# Patient Record
Sex: FEMALE | NOT HISPANIC OR LATINO | URBAN - METROPOLITAN AREA
[De-identification: names, ages, dates, MRNs, and addresses within clinical notes are randomized per-mention and may not be internally consistent; named-entity substitution may affect disease eponyms.]

---

## 2018-03-08 ENCOUNTER — AMBULATORY - HEALTHEAST (OUTPATIENT)
Dept: ADMINISTRATIVE | Facility: REHABILITATION | Age: 68
End: 2018-03-08

## 2018-03-08 DIAGNOSIS — R42 VERTIGO: ICD-10-CM

## 2018-03-09 ENCOUNTER — OFFICE VISIT - HEALTHEAST (OUTPATIENT)
Dept: OCCUPATIONAL THERAPY | Facility: REHABILITATION | Age: 68
End: 2018-03-09

## 2018-03-09 DIAGNOSIS — Z78.9 DECREASED ACTIVITIES OF DAILY LIVING (ADL): ICD-10-CM

## 2018-03-09 DIAGNOSIS — H81.11 BPPV (BENIGN PAROXYSMAL POSITIONAL VERTIGO), RIGHT: ICD-10-CM

## 2018-03-09 DIAGNOSIS — R26.81 UNSTEADINESS ON FEET: ICD-10-CM

## 2021-06-16 NOTE — PROGRESS NOTES
[facility] Certification Request    March 9, 2018      Patient: Evelia Mills  MR Number: [id number]  YOB: 1950  Date of Visit: 3/9/2018      Dear Dr. Dhaval Wise:    Thank you for this referral.   We are seeing Evelia Mills in Occupational Therapy for vertigo.    Medicare and/or Medicaid requires physician review and approval of the treatment plan. Please review the plan of care and verify that you agree with the therapy plan of care by co-signing this note.    Plan of Care  Authorization / Certification Start Date: 03/09/18  Authorization / Certification End Date: 06/07/18  Authorization / Certification Number of Visits: 12  Communication with: Referral Source  Patient Related Instruction: Nature of Condition;Treatment plan and rationale;Basis of treatment;Expected outcome  Times per Week: 1  Number of Weeks: 12  Number of Visits: 12  Neuromuscular Reeducation: vestibular  Canolith Repostioning:      Goals:  Patient will bend: to dress;to clean;without vertigo;without loss of balance;in 12 weeks  Patient able to perform bed mobility: without vertigo;in 12 weeks  Patient will turn head: without dizziness;for driving;for conversation;in 12 weeks  Patient will look up / down: without vertigo;for drinking;for reading;in 12 weeks      If you have any questions or concerns, please don't hesitate to call.    Sincerely,      Viviana Rausch, OT        Physician recommendation:     ___ Follow therapist's recommendation        ___ Modify therapy      *Physician co-signature indicates they certify the need for these services furnished within this plan and while under their care.      [facility]   Vestibular Initial Evaluation    Patient Name: Evelia Mills  Date of evaluation: 3/9/2018  Referral Diagnosis: vertigo  Referring provider: Asmita Castillo MD  Visit Diagnosis:     ICD-10-CM    1. BPPV (benign paroxysmal positional vertigo), right H81.11    2. Unsteadiness on feet  R26.81    3. Decreased activities of daily living (ADL) Z78.9        Assessment:      Patient has positive right Yash - Hallpike. Treated with right Epley maneuver X2.    Goals:  Patient will bend: to dress;to clean;without vertigo;without loss of balance;in 12 weeks  Patient able to perform bed mobility: without vertigo;in 12 weeks  Patient will turn head: without dizziness;for driving;for conversation;in 12 weeks  Patient will look up / down: without vertigo;for drinking;for reading;in 12 weeks    Patient's expectations/goals are realistic.    Barriers to Learning or Achieving Goals:  No Barriers.       Plan / Patient Instructions:        Plan of Care:   Authorization / Certification Start Date: 18  Authorization / Certification End Date: 18  Authorization / Certification Number of Visits: 12  Communication with: Referral Source  Patient Related Instruction: Nature of Condition;Treatment plan and rationale;Basis of treatment;Expected outcome  Times per Week: 1  Number of Weeks: 12  Number of Visits: 12  Neuromuscular Reeducation: vestibular  Canolith Repostioning:      Plan for next visit: repeat positional tests     Subjective:         History of Present Illness:    Evelia is a 67 y.o. female who presents to therapy today with complaints of vertigo and unsteadiness. Patient had sudden onset of symptoms in 2017. Symptoms are not improving. She has a history of similar symptoms about 9 years ago that were successfully treated with canalith maneuvers. Patient denies ear pain. Patient denies hearing changes.    Pain Ratin    Functional limitations are described as occurring with:   balance, bending, bed mobility, head turns, looking up or down, stepping over curbs         Objective:      Note: Items left blank indicates the item was not performed or not indicated at the time of the evaluation.    Patient Outcome Measures:  Dizziness Handicap Inventory  Score in %: 42     Vestibular Disorder  Examination  1. BPPV (benign paroxysmal positional vertigo), right     2. Unsteadiness on feet     3. Decreased activities of daily living (ADL)       Precautions/Restrictions: None  Posture Observation:      General standing posture is normal.    ROM:  Not Tested    Strength: Not Tested    Functional Mobility: good      Oculomotor Assessment: Not tested    VOR Function: Not tested    Positional Tests:  Hallpike Right:  Abnormal - Nystagmus right torsional, up beating  Hallpike Left:  NT    Balance Assessment: Not tested    Treatment Today: Treated with right Epley maneuver X2. Signs and symptoms appear negative on second maneuver.  TREATMENT MINUTES COMMENTS   Evaluation 20    Self-care/ Home management     Neuromuscular Re-education     Canalith repositioning procedure 10          Total 30    Blank areas are intentional and mean the treatment did not include these items.     GOALS AND PLAN OF CARE WERE ESTABLISHED IN COOPERATION WITH THE PATIENT    OT Evaluation Code: (Please list factors)   Comorbidities: left knee replacement, HTN   Profile/History Review: Brief    Need for eval modification: No   # Treatment options: Limited    Clinical Decision Making:  Low      Occupational Profile/ Medical and Therapy History and Comorbidities Occupational Performance Clinical Decision Making   (Complexity)   brief history with review of medical/therapy records related to the presenting problem.  No comorbidities 1-3 Performance deficits that result in activity limitations and/or participation restrictions.    No Assessment Modification  Low complexity, which includes  problem-focused assessments, and consideration of a limited number of treatment options.      expanded review of medical/therapy records and additional review of physical, cognitive and psychosocial history.    May have comorbidities 3-5 Performance deficits that result in activity limitations and/or participation restrictions.    Minimal to moderate  modification of assessment Moderate complexity, which includes analysis of data from detailed assessments, and consideration of several treatment options.         Review of medical/therapy records and extensive additional review of physical, cognitive and psychosocial history.  Comorbidities affect occupational performance 5 or more Performance deficits that result in activity limitations and/or participation restrictions.    Significant modification of assessment High complexity, analysis of  Occupational profile and data,  Comprehensive assessments, multiple treatment options.            Viviana Rausch  3/9/2018  8:25-8:55 AM

## 2021-06-17 NOTE — PROGRESS NOTES
[facility] Discharge Summary  Patient Name: Evelia Mills  Date: 4/26/2018  Referral Diagnosis: vertigo  Referring provider: Asmita Castillo MD  Visit Diagnosis:   1. BPPV (benign paroxysmal positional vertigo), right     2. Unsteadiness on feet     3. Decreased activities of daily living (ADL)         Goal status: Unable to assess as patient did not return.    Patient was seen for 1 visit. The patient discontinued therapy, did not return.    The patient will need a new referral to resume.    Thank you for your referral.  Viviana Rausch  4/26/2018  12:14 PM